# Patient Record
Sex: FEMALE | Race: WHITE
[De-identification: names, ages, dates, MRNs, and addresses within clinical notes are randomized per-mention and may not be internally consistent; named-entity substitution may affect disease eponyms.]

---

## 2017-01-06 ENCOUNTER — HOSPITAL ENCOUNTER (INPATIENT)
Dept: HOSPITAL 19 - OB | Age: 32
LOS: 3 days | Discharge: HOME | End: 2017-01-09
Attending: OBSTETRICS & GYNECOLOGY | Admitting: OBSTETRICS & GYNECOLOGY
Payer: MEDICAID

## 2017-01-06 ENCOUNTER — HOSPITAL ENCOUNTER (OUTPATIENT)
Dept: HOSPITAL 19 - COL.RAD | Age: 32
End: 2017-01-06
Attending: OBSTETRICS & GYNECOLOGY
Payer: MEDICAID

## 2017-01-06 VITALS — SYSTOLIC BLOOD PRESSURE: 139 MMHG | HEART RATE: 96 BPM | DIASTOLIC BLOOD PRESSURE: 86 MMHG

## 2017-01-06 VITALS — HEART RATE: 110 BPM | DIASTOLIC BLOOD PRESSURE: 85 MMHG | TEMPERATURE: 98.1 F | SYSTOLIC BLOOD PRESSURE: 137 MMHG

## 2017-01-06 VITALS — HEART RATE: 99 BPM | DIASTOLIC BLOOD PRESSURE: 89 MMHG | SYSTOLIC BLOOD PRESSURE: 138 MMHG

## 2017-01-06 VITALS — BODY MASS INDEX: 48.74 KG/M2 | WEIGHT: 292.55 LBS | HEIGHT: 65.05 IN

## 2017-01-06 VITALS — DIASTOLIC BLOOD PRESSURE: 86 MMHG | SYSTOLIC BLOOD PRESSURE: 131 MMHG | HEART RATE: 103 BPM

## 2017-01-06 VITALS — DIASTOLIC BLOOD PRESSURE: 88 MMHG | TEMPERATURE: 98 F | HEART RATE: 107 BPM | SYSTOLIC BLOOD PRESSURE: 135 MMHG

## 2017-01-06 VITALS — SYSTOLIC BLOOD PRESSURE: 144 MMHG | DIASTOLIC BLOOD PRESSURE: 87 MMHG | HEART RATE: 98 BPM

## 2017-01-06 VITALS — SYSTOLIC BLOOD PRESSURE: 143 MMHG | DIASTOLIC BLOOD PRESSURE: 94 MMHG | HEART RATE: 106 BPM

## 2017-01-06 VITALS — DIASTOLIC BLOOD PRESSURE: 95 MMHG | SYSTOLIC BLOOD PRESSURE: 151 MMHG | HEART RATE: 99 BPM

## 2017-01-06 VITALS — DIASTOLIC BLOOD PRESSURE: 86 MMHG | SYSTOLIC BLOOD PRESSURE: 139 MMHG | HEART RATE: 96 BPM

## 2017-01-06 VITALS — SYSTOLIC BLOOD PRESSURE: 154 MMHG | HEART RATE: 105 BPM | DIASTOLIC BLOOD PRESSURE: 91 MMHG

## 2017-01-06 VITALS — DIASTOLIC BLOOD PRESSURE: 89 MMHG | HEART RATE: 105 BPM | SYSTOLIC BLOOD PRESSURE: 147 MMHG

## 2017-01-06 VITALS — HEART RATE: 103 BPM | DIASTOLIC BLOOD PRESSURE: 87 MMHG | SYSTOLIC BLOOD PRESSURE: 136 MMHG

## 2017-01-06 VITALS — HEART RATE: 101 BPM | SYSTOLIC BLOOD PRESSURE: 150 MMHG | DIASTOLIC BLOOD PRESSURE: 89 MMHG | TEMPERATURE: 98 F

## 2017-01-06 VITALS — DIASTOLIC BLOOD PRESSURE: 92 MMHG | HEART RATE: 103 BPM | SYSTOLIC BLOOD PRESSURE: 137 MMHG

## 2017-01-06 VITALS — HEART RATE: 99 BPM | SYSTOLIC BLOOD PRESSURE: 148 MMHG | DIASTOLIC BLOOD PRESSURE: 88 MMHG

## 2017-01-06 VITALS — HEART RATE: 101 BPM | DIASTOLIC BLOOD PRESSURE: 94 MMHG | SYSTOLIC BLOOD PRESSURE: 146 MMHG

## 2017-01-06 VITALS — TEMPERATURE: 98.1 F

## 2017-01-06 VITALS — SYSTOLIC BLOOD PRESSURE: 154 MMHG | DIASTOLIC BLOOD PRESSURE: 89 MMHG | HEART RATE: 103 BPM

## 2017-01-06 DIAGNOSIS — Z3A.37: ICD-10-CM

## 2017-01-06 DIAGNOSIS — N85.8: ICD-10-CM

## 2017-01-06 DIAGNOSIS — Z3A.00: ICD-10-CM

## 2017-01-06 DIAGNOSIS — O09.293: ICD-10-CM

## 2017-01-06 DIAGNOSIS — E72.12: ICD-10-CM

## 2017-01-06 DIAGNOSIS — Z40.09: ICD-10-CM

## 2017-01-06 DIAGNOSIS — O40.3XX0: Primary | ICD-10-CM

## 2017-01-06 DIAGNOSIS — K66.0: ICD-10-CM

## 2017-01-06 DIAGNOSIS — O34.211: ICD-10-CM

## 2017-01-06 DIAGNOSIS — O40.3XX0: ICD-10-CM

## 2017-01-06 DIAGNOSIS — O14.03: Primary | ICD-10-CM

## 2017-01-06 DIAGNOSIS — O26.23: ICD-10-CM

## 2017-01-06 DIAGNOSIS — E66.01: ICD-10-CM

## 2017-01-06 LAB
ADJUSTED CALCIUM: 10.3 MG/DL (ref 8.4–10.2)
ALBUMIN SERPL-MCNC: 3.2 GM/DL (ref 3.5–5)
ALP SERPL-CCNC: 105 U/L (ref 50–136)
ALT SERPL-CCNC: 32 U/L (ref 9–52)
ANION GAP SERPL CALC-SCNC: 8 MMOL/L (ref 7–16)
BASOPHILS # BLD: 0 10*3/UL (ref 0–0.2)
BASOPHILS NFR BLD AUTO: 0.3 % (ref 0–2)
BILIRUB SERPL-MCNC: 0.5 MG/DL (ref 0–1)
BUN SERPL-MCNC: 7 MG/DL (ref 7–17)
CALCIUM SERPL-MCNC: 9.7 MG/DL (ref 8.4–10.2)
CHLORIDE SERPL-SCNC: 104 MMOL/L (ref 98–107)
CO2 SERPL-SCNC: 22 MMOL/L (ref 22–30)
CREAT SERPL-SCNC: 0.34 MG/DL (ref 0.52–1.25)
EOSINOPHIL # BLD: 0.1 10*3/UL (ref 0–0.7)
EOSINOPHIL NFR BLD: 0.8 % (ref 0–4)
ERYTHROCYTE [DISTWIDTH] IN BLOOD BY AUTOMATED COUNT: 14.7 % (ref 11.5–14.5)
GLUCOSE SERPL-MCNC: 101 MG/DL (ref 74–106)
GRANULOCYTES # BLD AUTO: 69.6 % (ref 42.2–75.2)
HCT VFR BLD AUTO: 37.6 % (ref 37–47)
HGB BLD-MCNC: 12.6 G/DL (ref 12.5–16)
LYMPHOCYTES # BLD: 2.1 10*3/UL (ref 1.2–3.4)
LYMPHOCYTES NFR BLD: 20.3 % (ref 20–51)
MCH RBC QN AUTO: 29 PG (ref 27–31)
MCHC RBC AUTO-ENTMCNC: 34 G/DL (ref 33–37)
MCV RBC AUTO: 87 FL (ref 80–100)
MONOCYTES # BLD: 0.8 10*3/UL (ref 0.1–0.6)
MONOCYTES NFR BLD AUTO: 7.6 % (ref 1.7–9.3)
NEUTROPHILS # BLD: 7.1 10*3/UL (ref 1.4–6.5)
PLATELET # BLD AUTO: 180 K/MM3 (ref 130–400)
PMV BLD AUTO: 10.3 FL (ref 7.4–10.4)
POTASSIUM SERPL-SCNC: 4 MMOL/L (ref 3.4–5)
PROT SERPL-MCNC: 6.4 GM/DL (ref 6.4–8.2)
RBC # BLD AUTO: 4.3 M/MM3 (ref 4.1–5.3)
SODIUM SERPL-SCNC: 134 MMOL/L (ref 137–145)
WBC # BLD AUTO: 10.2 K/MM3 (ref 4.8–10.8)

## 2017-01-06 PROCEDURE — 0UB70ZZ EXCISION OF BILATERAL FALLOPIAN TUBES, OPEN APPROACH: ICD-10-PCS | Performed by: OBSTETRICS & GYNECOLOGY

## 2017-01-07 VITALS — SYSTOLIC BLOOD PRESSURE: 133 MMHG | DIASTOLIC BLOOD PRESSURE: 77 MMHG | HEART RATE: 123 BPM | TEMPERATURE: 97.9 F

## 2017-01-07 VITALS — TEMPERATURE: 98.3 F | HEART RATE: 119 BPM | SYSTOLIC BLOOD PRESSURE: 135 MMHG | DIASTOLIC BLOOD PRESSURE: 84 MMHG

## 2017-01-07 VITALS — SYSTOLIC BLOOD PRESSURE: 139 MMHG | DIASTOLIC BLOOD PRESSURE: 88 MMHG | TEMPERATURE: 97.9 F | HEART RATE: 110 BPM

## 2017-01-07 VITALS — HEART RATE: 113 BPM | TEMPERATURE: 98.2 F | DIASTOLIC BLOOD PRESSURE: 85 MMHG | SYSTOLIC BLOOD PRESSURE: 148 MMHG

## 2017-01-07 LAB
HCT VFR BLD AUTO: 32.8 % (ref 37–47)
HGB BLD-MCNC: 10.8 G/DL (ref 12.5–16)

## 2017-01-08 VITALS — HEART RATE: 117 BPM | SYSTOLIC BLOOD PRESSURE: 132 MMHG | TEMPERATURE: 98.4 F | DIASTOLIC BLOOD PRESSURE: 82 MMHG

## 2017-01-08 VITALS — TEMPERATURE: 98.3 F | HEART RATE: 104 BPM | DIASTOLIC BLOOD PRESSURE: 63 MMHG | SYSTOLIC BLOOD PRESSURE: 131 MMHG

## 2017-01-08 VITALS — TEMPERATURE: 97.9 F | SYSTOLIC BLOOD PRESSURE: 129 MMHG | DIASTOLIC BLOOD PRESSURE: 81 MMHG | HEART RATE: 114 BPM

## 2017-01-09 VITALS — SYSTOLIC BLOOD PRESSURE: 140 MMHG | DIASTOLIC BLOOD PRESSURE: 81 MMHG | HEART RATE: 117 BPM | TEMPERATURE: 97.7 F

## 2019-03-22 ENCOUNTER — HOSPITAL ENCOUNTER (EMERGENCY)
Dept: HOSPITAL 19 - COL.ER | Age: 34
Discharge: HOME | End: 2019-03-22
Payer: SELF-PAY

## 2019-03-22 VITALS — DIASTOLIC BLOOD PRESSURE: 104 MMHG | TEMPERATURE: 98.1 F | SYSTOLIC BLOOD PRESSURE: 146 MMHG

## 2019-03-22 VITALS — BODY MASS INDEX: 46.86 KG/M2 | WEIGHT: 274.48 LBS | HEIGHT: 64.02 IN

## 2019-03-22 VITALS — HEART RATE: 94 BPM

## 2019-03-22 DIAGNOSIS — N39.0: Primary | ICD-10-CM

## 2019-03-22 DIAGNOSIS — Z90.89: ICD-10-CM

## 2019-03-22 LAB
GLUCOSE UR QL STRIP.AUTO: (no result)
PH UR STRIP.AUTO: 5 [PH] (ref 5–8)
RBC # UR STRIP.AUTO: (no result) /UL
RBC # UR: >50 /HPF
SP GR UR STRIP.AUTO: 1.02 (ref 1–1.03)
SQUAMOUS # URNS: (no result) /HPF
UA DIPSTICK PNL UR STRIP.AUTO: (no result)
URINE LEUKOCYTE ESTERASE: (no result)
URN COLLECT METHOD CLASS: (no result)

## 2021-01-22 ENCOUNTER — HOSPITAL ENCOUNTER (EMERGENCY)
Dept: HOSPITAL 19 - COL.ER | Age: 36
Discharge: HOME | End: 2021-01-22
Attending: EMERGENCY MEDICINE
Payer: COMMERCIAL

## 2021-01-22 VITALS — BODY MASS INDEX: 39.71 KG/M2 | WEIGHT: 232.59 LBS | HEIGHT: 64.02 IN

## 2021-01-22 VITALS — TEMPERATURE: 98.1 F

## 2021-01-22 VITALS — DIASTOLIC BLOOD PRESSURE: 83 MMHG | HEART RATE: 79 BPM | SYSTOLIC BLOOD PRESSURE: 126 MMHG

## 2021-01-22 DIAGNOSIS — K57.32: Primary | ICD-10-CM

## 2021-01-22 DIAGNOSIS — Z79.82: ICD-10-CM

## 2021-01-22 DIAGNOSIS — Z88.8: ICD-10-CM

## 2021-01-22 DIAGNOSIS — Z86.718: ICD-10-CM

## 2021-01-22 DIAGNOSIS — Z32.02: ICD-10-CM

## 2021-01-22 LAB
ALBUMIN SERPL-MCNC: 4.6 GM/DL (ref 3.5–5)
ALP SERPL-CCNC: 73 U/L (ref 50–136)
ALT SERPL-CCNC: 14 U/L (ref 4–34)
ANION GAP SERPL CALC-SCNC: 10 MMOL/L (ref 7–16)
AST SERPL-CCNC: 16 U/L (ref 15–37)
BASOPHILS # BLD: 0.1 10*3/UL (ref 0–0.2)
BASOPHILS NFR BLD AUTO: 0.5 % (ref 0–2)
BILIRUB SERPL-MCNC: 0.4 MG/DL (ref 0–1)
BUN SERPL-MCNC: 17 MG/DL (ref 7–17)
CALCIUM SERPL-MCNC: 9.9 MG/DL (ref 8.4–10.2)
CHLORIDE SERPL-SCNC: 107 MMOL/L (ref 98–107)
CO2 SERPL-SCNC: 23 MMOL/L (ref 22–30)
CREAT SERPL-SCNC: 0.51 UMOL/L (ref 0.52–1.25)
CRP SERPL-MCNC: 2.3 MG/DL (ref 0–0.9)
EOSINOPHIL # BLD: 0.3 10*3/UL (ref 0–0.7)
EOSINOPHIL NFR BLD: 2 % (ref 0–4)
ERYTHROCYTE [DISTWIDTH] IN BLOOD BY AUTOMATED COUNT: 13.4 % (ref 11.5–14.5)
GLUCOSE SERPL-MCNC: 101 MG/DL (ref 74–106)
GRANULOCYTES # BLD AUTO: 66.4 % (ref 42.2–75.2)
HCT VFR BLD AUTO: 41.7 % (ref 37–47)
HGB BLD-MCNC: 13.4 G/DL (ref 12.5–16)
LIPASE SERPL-CCNC: 277 U/L (ref 23–300)
LYMPHOCYTES # BLD: 3.7 10*3/UL (ref 1.2–3.4)
LYMPHOCYTES NFR BLD: 24.2 % (ref 20–51)
MCH RBC QN AUTO: 29 PG (ref 27–31)
MCHC RBC AUTO-ENTMCNC: 32 G/DL (ref 33–37)
MCV RBC AUTO: 89 FL (ref 80–100)
MONOCYTES # BLD: 1 10*3/UL (ref 0.1–0.6)
MONOCYTES NFR BLD AUTO: 6.3 % (ref 1.7–9.3)
NEUTROPHILS # BLD: 10.3 10*3/UL (ref 1.4–6.5)
PH UR STRIP.AUTO: 5 [PH] (ref 5–8)
PLATELET # BLD AUTO: 305 K/MM3 (ref 130–400)
PMV BLD AUTO: 9.6 FL (ref 7.4–10.4)
POTASSIUM SERPL-SCNC: 3.9 MMOL/L (ref 3.4–5)
PROT SERPL-MCNC: 8.4 GM/DL (ref 6.4–8.2)
RBC # BLD AUTO: 4.67 M/MM3 (ref 4.1–5.3)
RBC # UR: (no result) /HPF
SODIUM SERPL-SCNC: 140 MMOL/L (ref 137–145)
SP GR UR STRIP.AUTO: 1.03 (ref 1–1.03)
SQUAMOUS # URNS: (no result) /HPF
UA DIPSTICK PNL UR STRIP.AUTO: (no result)
URN COLLECT METHOD CLASS: (no result)

## 2022-08-11 ENCOUNTER — HOSPITAL ENCOUNTER (OUTPATIENT)
Dept: HOSPITAL 19 - SDCO | Age: 37
Discharge: HOME | End: 2022-08-11
Attending: UROLOGY
Payer: SELF-PAY

## 2022-08-11 VITALS — HEART RATE: 98 BPM | TEMPERATURE: 98.5 F | SYSTOLIC BLOOD PRESSURE: 145 MMHG | DIASTOLIC BLOOD PRESSURE: 98 MMHG

## 2022-08-11 VITALS — DIASTOLIC BLOOD PRESSURE: 93 MMHG | HEART RATE: 84 BPM | SYSTOLIC BLOOD PRESSURE: 130 MMHG

## 2022-08-11 VITALS — HEIGHT: 65 IN | BODY MASS INDEX: 35.74 KG/M2 | WEIGHT: 214.51 LBS

## 2022-08-11 VITALS — SYSTOLIC BLOOD PRESSURE: 131 MMHG | HEART RATE: 85 BPM | DIASTOLIC BLOOD PRESSURE: 91 MMHG

## 2022-08-11 VITALS — SYSTOLIC BLOOD PRESSURE: 130 MMHG | DIASTOLIC BLOOD PRESSURE: 85 MMHG | HEART RATE: 73 BPM

## 2022-08-11 VITALS — DIASTOLIC BLOOD PRESSURE: 89 MMHG | SYSTOLIC BLOOD PRESSURE: 143 MMHG | HEART RATE: 86 BPM

## 2022-08-11 VITALS — HEART RATE: 87 BPM | SYSTOLIC BLOOD PRESSURE: 143 MMHG | DIASTOLIC BLOOD PRESSURE: 89 MMHG | TEMPERATURE: 98.2 F

## 2022-08-11 DIAGNOSIS — Z87.891: ICD-10-CM

## 2022-08-11 DIAGNOSIS — Z79.899: ICD-10-CM

## 2022-08-11 DIAGNOSIS — N20.1: Primary | ICD-10-CM

## 2022-08-11 PROCEDURE — C1769 GUIDE WIRE: HCPCS

## 2022-08-11 PROCEDURE — C2617 STENT, NON-COR, TEM W/O DEL: HCPCS

## 2022-08-11 NOTE — NUR
PT BROUGHT TO MEDICAL UNIT AT THIS TIME. POST OP VSS. PT DENIES ANY PAIN WITH
URINATION. TOLERATING SIPS AND ICE CHIPS AT THIS TIME. PT OREDERING MEAL TRAY.
WILL CONTINUE TO MONITOR.

## 2022-08-11 NOTE — NUR
THE PATIENT IS ANXIOUS TO LEAVE THIS EVENING. SHE STATES SHE IS FEELING GREAT.
THE PATIENT HAS EATEN, DRANK FLUIDS, URINATED AND WALKED THE [place] WAYS.
INFORMED THE PATIENT THIS RN WOULD NEED A COUPLE MORE SETS OF POST OP VITAL
SIGNS TO ENSURE STABLIZATION AND THEN SHE COULD DISCHARGE. NO OTHER CONCERNS.